# Patient Record
Sex: FEMALE
[De-identification: names, ages, dates, MRNs, and addresses within clinical notes are randomized per-mention and may not be internally consistent; named-entity substitution may affect disease eponyms.]

---

## 2020-03-23 ENCOUNTER — HOSPITAL ENCOUNTER (OUTPATIENT)
Dept: HOSPITAL 88 - DX | Age: 65
End: 2020-03-23
Attending: OBSTETRICS & GYNECOLOGY
Payer: MEDICARE

## 2020-03-23 DIAGNOSIS — Z01.818: Primary | ICD-10-CM

## 2020-03-23 DIAGNOSIS — N81.89: ICD-10-CM

## 2020-03-23 PROCEDURE — 93005 ELECTROCARDIOGRAM TRACING: CPT

## 2020-03-23 PROCEDURE — 71046 X-RAY EXAM CHEST 2 VIEWS: CPT

## 2020-03-23 NOTE — DIAGNOSTIC IMAGING REPORT
Chest, PA and lateral.



History: N81.89.



Comparison: None available.



Discussion:  The cardiomediastinal silhouette and pulmonary vasculature are

within normal limits. The lungs are clear without evidence of consolidation or

effusion.  There are mild degenerative changes of the thoracic spine.



IMPRESSION: 

No acute cardiopulmonary abnormality.



Signed by: Stephen E Dreyer, MD on 3/23/2020 10:11 AM

## 2020-04-21 LAB
ALBUMIN SERPL-MCNC: 4 G/DL (ref 3.5–5)
ALBUMIN/GLOB SERPL: 1.3 {RATIO} (ref 0.8–2)
ALP SERPL-CCNC: 73 IU/L (ref 40–150)
ALT SERPL-CCNC: 24 IU/L (ref 0–55)
ANION GAP SERPL CALC-SCNC: 10 MMOL/L (ref 8–16)
BASOPHILS # BLD AUTO: 0.1 10*3/UL (ref 0–0.1)
BASOPHILS NFR BLD AUTO: 1 % (ref 0–1)
BUN SERPL-MCNC: 16 MG/DL (ref 7–26)
BUN/CREAT SERPL: 23 (ref 6–25)
CALCIUM SERPL-MCNC: 9.7 MG/DL (ref 8.4–10.2)
CHLORIDE SERPL-SCNC: 104 MMOL/L (ref 98–107)
CO2 SERPL-SCNC: 31 MMOL/L (ref 22–29)
DEPRECATED NEUTROPHILS # BLD AUTO: 2.8 10*3/UL (ref 2.1–6.9)
EGFRCR SERPLBLD CKD-EPI 2021: > 60 ML/MIN (ref 60–?)
EOSINOPHIL # BLD AUTO: 0.1 10*3/UL (ref 0–0.4)
EOSINOPHIL NFR BLD AUTO: 1.7 % (ref 0–6)
ERYTHROCYTE [DISTWIDTH] IN CORD BLOOD: 13.1 % (ref 11.7–14.4)
GLOBULIN PLAS-MCNC: 3 G/DL (ref 2.3–3.5)
GLUCOSE SERPLBLD-MCNC: 94 MG/DL (ref 74–118)
HCT VFR BLD AUTO: 41.2 % (ref 34.2–44.1)
HGB BLD-MCNC: 12.9 G/DL (ref 12–16)
LYMPHOCYTES # BLD: 2.4 10*3/UL (ref 1–3.2)
LYMPHOCYTES NFR BLD AUTO: 40.3 % (ref 18–39.1)
MCH RBC QN AUTO: 28.2 PG (ref 28–32)
MCHC RBC AUTO-ENTMCNC: 31.3 G/DL (ref 31–35)
MCV RBC AUTO: 90.2 FL (ref 81–99)
MONOCYTES # BLD AUTO: 0.6 10*3/UL (ref 0.2–0.8)
MONOCYTES NFR BLD AUTO: 9.5 % (ref 4.4–11.3)
NEUTS SEG NFR BLD AUTO: 47.3 % (ref 38.7–80)
PLATELET # BLD AUTO: 355 X10E3/UL (ref 140–360)
POTASSIUM SERPL-SCNC: 5 MMOL/L (ref 3.5–5.1)
RBC # BLD AUTO: 4.57 X10E6/UL (ref 3.6–5.1)
SODIUM SERPL-SCNC: 140 MMOL/L (ref 136–145)

## 2020-04-22 ENCOUNTER — HOSPITAL ENCOUNTER (OUTPATIENT)
Dept: HOSPITAL 88 - OR | Age: 65
Discharge: HOME | End: 2020-04-22
Attending: OBSTETRICS & GYNECOLOGY
Payer: MEDICARE

## 2020-04-22 VITALS — SYSTOLIC BLOOD PRESSURE: 130 MMHG | DIASTOLIC BLOOD PRESSURE: 73 MMHG

## 2020-04-22 DIAGNOSIS — N39.3: ICD-10-CM

## 2020-04-22 DIAGNOSIS — N81.2: Primary | ICD-10-CM

## 2020-04-22 DIAGNOSIS — F41.9: ICD-10-CM

## 2020-04-22 DIAGNOSIS — K21.9: ICD-10-CM

## 2020-04-22 DIAGNOSIS — R06.83: ICD-10-CM

## 2020-04-22 DIAGNOSIS — Z87.891: ICD-10-CM

## 2020-04-22 PROCEDURE — 85025 COMPLETE CBC W/AUTO DIFF WBC: CPT

## 2020-04-22 PROCEDURE — 80053 COMPREHEN METABOLIC PANEL: CPT

## 2020-04-22 PROCEDURE — 36415 COLL VENOUS BLD VENIPUNCTURE: CPT

## 2020-04-22 PROCEDURE — 57282 COLPOPEXY EXTRAPERITONEAL: CPT

## 2020-04-22 PROCEDURE — 57240 ANTERIOR COLPORRHAPHY: CPT

## 2020-04-22 NOTE — PRE OP HISTORY & PHYSICAL
Having surgery tomorrow.

 

HISTORY OF PRESENT ILLNESS:  A 64-year-old  5, para 5, who has been evaluated in

the office on  for uterine prolapse.  She complains of bulge in the vaginal

area, incomplete emptying, and severe pelvic discomfort, that has been getting worse.

The bulge is estimated to be lemon size, but she says now it is getting bigger and she

has to use her fingers to apply bladder pressure to empty her bladder.  She also

complains of urinary incontinence, stress incontinence, frequency, and uses many pads.

Symptoms have been accentuating by standing, lifting, and straining.  Over the last 6

weeks, the patient has been calling the office asking for her surgery as her symptoms

are getting worse and more discomfort has been experienced. 

 

PAST MEDICAL HISTORY:  None of significance.

 

PAST SURGICAL HISTORY:  She had hysterectomy and knee replacement.

 

ALLERGIES:  NO KNOWN DRUG ALLERGIES.

 

SOCIAL HISTORY:  Denies smoking, alcohol, or drug use.

 

PHYSICAL EXAMINATION:

VITAL SIGNS:  Stable. 

CHEST:  Clear to auscultation. 

CARDIOVASCULAR:  Regular rhythm. 

ABDOMEN:  Soft, nontender. 

APPEARANCE:  She is a well-nourished, well-developed woman with no signs of severe

distress. 

VAGINAL:  Shows stage III cystocele with second-degree prolapse.

ASSESSMENT AND PLAN:  Due to the patient's current symptoms and inability to void except

by pushing the bladder upwards with her fingers and her distress is getting worse.  We

will proceed with cystocele repair and sacrospinous colpopexy.  Risks and potential

complications were all explained to the patient's, including bleeding, infection, organ

injury also.  The patient was offered pessary for treatment, but she declined.  All of

her questions were answered. 

 

 

 

 

______________________________

Julia Martinez MD DD/CHRISTY

D:  2020 16:47:11

T:  2020 17:59:43

Job #:  [id number]

## 2020-04-22 NOTE — XMS REPORT
Patient Summary Document

                             Created on: 2020



GABRIELA MILLER

External Reference #: [id number]

: 1955

Sex: Female



Demographics







                          Address                    [address] [place]  [address]

 

                          Home Phone                [phone]

 

                          Preferred Language        Unknown

 

                          Marital Status            Unknown

 

                          Anabaptism Affiliation     Unknown

 

                          Race                      Unknown

 

                                        Additional Race(s) 

 

 

                          Ethnic Group              Unknown





Author







                          Author                    [facility]

 

                          [facility]

 

                          Address                   Unknown

 

                          Phone                     Unavailable







Support







                Name            Relationship    Address         Phone

 

                    JIM MILLER       PRS                 [id number]

                                        ...

[place], [place]  [address] [phone]

 

                    JIM MILLER       PRS                 [id number]

                                        ...

[place]  [address]                 [phone]

 

                    MARLO MILLER    PRS                 [address] [place], [place]  [address] [phone]

 

                    JIM MILLER       PRS                 [address] [place]  [address]                  [phone]







Care Team Providers







                    Care Team Member Name    Role                Phone

 

                    RENNY MEJIA      Unavailable         Unavailable







Payers







             Payer Name    Policy Type    Policy Number    Effective Date    Expiration Date







Problems

This patient has no known problems.



Allergies, Adverse Reactions, Alerts







          Allergy Name    Allergy Type    Status    Severity    Reaction(s)    Onset Date    Inactive 

Date                      Treating Clinician        Comments

 

        No Known Allergies    DA      Active    U               2014 00:00:00                     







Medications

This patient has no known medications.



Encounters







             Start Date/Time    End Date/Time    Encounter Type    Admission Type    Attending Clinicians

                    Care Facility       Care Department     Encounter ID

 

        2018 13:02:06    2018 13:02:06    Outpatient                    [facility]     [id number]

 

        2018 00:00:00    2018 00:00:00    Outpatient                    [facility]     [id number]

 

        2018 00:00:00    2018 00:00:00    Outpatient                    [facility]     [id number]

 

        2018-10-24 00:00:00    2018-10-24 00:00:00    Outpatient                    [facility]     [id number]

 

        2018-10-24 00:00:00    2018-10-24 00:00:00    Outpatient                    [facility]     [id number]

 

        2018-10-22 11:57:28    2018-10-22 11:57:28    Outpatient                    [facility]     [id number]

 

        2018-10-16 14:07:26    2018-10-16 14:07:26    Outpatient                    [facility]     [id number]

 

        2018-10-16 13:01:04    2018-10-16 13:01:04    Outpatient                    [facility]     [id number]

 

        2018 14:22:45    2018 14:22:45    Outpatient                    [facility]     [id number]

 

        2018 00:00:00    2018 00:00:00    Outpatient                    [facility]     [id number]

 

        2018 00:00:00    2018 00:00:00    Outpatient                    [facility]     [id number]

 

        2018 00:00:00    2018 00:00:00    Outpatient                    [facility]     [id number]

 

        2018 10:25:09    2018 10:25:09    Outpatient                    [facility]     [id number]

 

        2018 10:51:00    2018 10:51:00    Outpatient                    [facility]     [id number]

 

        2018 08:48:47    2018 08:48:47    Outpatient                    [facility]     [id number]

 

        2017 00:00:00    2017 00:00:00    Outpatient                    [facility]     [id number]

 

        2017 00:00:00    2017 00:00:00    Outpatient                    [facility]     [id number]

 

        2017 00:00:00    2017 00:00:00    Outpatient                    [facility]     [id number]

 

        2017 13:28:46    2017 13:28:46    Outpatient                    [facility]     [id number]

 

        2017 00:00:00    2017 00:00:00    Outpatient                    [facility]     [id number]

 

        2017 00:00:00    2017 00:00:00    Outpatient                    [facility]     [id number]

 

        2017-11-10 09:49:20    2017-11-10 09:49:20    Outpatient                    [facility]     [id number]

 

        2017 13:44:58    2017 13:44:58    Outpatient                    [facility]     [id number]

 

        2017 13:43:32    2017 13:43:32    Outpatient                    [facility]     [id number]

 

        2017 10:54:44    2017 10:54:44    Outpatient                    [facility]     [id number]

 

        2017 00:00:00    2017 00:00:00    Outpatient                    [facility]     [id number]

 

        2017-10-27 00:00:00    2017-10-27 00:00:00    Outpatient                    [facility]     [id number]

 

        2017-10-24 10:24:51    2017-10-24 10:24:51    Outpatient                    [facility]     [id number]

 

        2017-10-19 16:03:17    2017-10-19 16:03:17    Outpatient                    [facility]     [id number]

 

        2017-10-19 14:07:49    2017-10-19 14:07:49    Outpatient                    [facility]     [id number]

 

        2017-10-19 14:04:13    2017-10-19 14:04:13    Outpatient                    [facility]     [id number]

 

        2017-10-11 08:48:10    2017-10-11 08:48:10    Outpatient                    [facility]     [id number]

 

        2017-10-10 10:26:08    2017-10-10 10:26:08    Outpatient                    [facility]     [id number]

 

        2017 10:16:06    2017 10:16:06    Outpatient                    [facility]     [id number]

 

        2017 14:08:01    2017 14:08:01    Outpatient                    [facility]     [id number]

 

        2017 14:33:04    2017 14:33:04    Outpatient                    [facility]     [id number]

 

        2017 14:13:56    2017 14:13:56    Outpatient                    [facility]     [id number]

 

        2017 14:02:15    2017 14:02:15    Outpatient                    [facility]     [id number]

 

        2017 14:09:12    2017 14:09:12    Outpatient                    [facility]     [id number]

 

        2017 14:08:55    2017 14:08:55    Outpatient                    [facility]     [id number]

 

        2017 00:00:00    2017 00:00:00    Outpatient                    [facility]     [id number]

 

        2017 00:00:00    2017 00:00:00    Outpatient                    [facility]     [id number]

 

        2017 00:00:00    2017 00:00:00    Outpatient                    [facility]     [id number]

 

        2017 00:00:00    2017 00:00:00    Outpatient                    [facility]     [id number]

 

        2017 00:00:00    2017 00:00:00    Outpatient                    [facility]     [id number]

 

        2017 00:00:00    2017 00:00:00    Outpatient                    [facility]     [id number]

 

        2017 00:00:00    2017 00:00:00    Outpatient                    [facility]     [id number]

 

        2017 00:00:00    2017 00:00:00    Outpatient                    [facility]     [id number]

 

        2017 00:00:00    2017 00:00:00    Outpatient                    [facility]     [id number]

 

        2017-08-15 00:00:00    2017-08-15 00:00:00    Outpatient                    [facility]     [id number]

 

        2017 07:47:08    2017 07:47:08    Outpatient                    [facility]     [id number]

 

        2017 13:29:56    2017 13:29:56    Outpatient                    [facility]     [id number]

 

        2017 09:38:30    2017 09:38:30    Outpatient                    [facility]     [id number]

 

        2017 08:43:41    2017 08:43:41    Outpatient                    [facility]     [id number]

 

        2017 08:14:53    2017 08:14:53    Outpatient                    [facility]     [id number]

 

        2017 08:58:36    2017 08:58:36    Outpatient                    [facility]     [id number]

 

        2017 08:49:19    2017 08:49:19    Outpatient                    [facility]     [id number]

 

        2017 13:22:20    2017 13:22:20    Outpatient                    [facility]     [id number]

 

        2017-07-10 14:13:05    2017-07-10 14:13:05    Outpatient                    [facility]     [id number]

 

        2017 00:00:00    2017 00:00:00    Outpatient                    [facility]     [id number]

 

        2017 00:00:00    2017 00:00:00    Outpatient                    [facility]     [id number]

 

        2017 00:00:00    2017 00:00:00    Outpatient                    [facility]     [id number]

 

        2017 13:31:11    2017 13:31:11    Outpatient                    [facility]     [id number]

 

        2017 00:00:00    2017 00:00:00    Outpatient                    [facility]     [id number]

 

        2017-06-10 13:42:13    2017-06-10 13:42:13    Emergency                    [facility]     [id number]

 

        2017-06-10 12:32:03    2017-06-10 12:32:03    Emergency                    [facility]     [id number]

 

        2017-06-10 10:31:59    2017-06-10 10:31:59    Emergency                    [facility]     [id number]

 

        2017 05:48:00    2017 05:48:00    Outpatient                    [facility]     [id number]

 

        2017 10:29:35    2017 00:00:00    Inpatient                    [facility]     [id number]

 

        2017 10:05:22    2017 10:05:22    Outpatient                    [facility]     [id number]

 

        2017 13:51:08    2017 13:51:08    Outpatient                    [facility]     [id number]

 

        2017 09:21:35    2017 09:21:35    Outpatient                    [facility]     [id number]

 

        2017 00:00:00    2017 00:00:00    Outpatient                    [facility]     [id number]

 

        2017 14:16:03    2017 14:16:03    Outpatient                    [facility]     [id number]







Results







           Test Description    Test Time    Test Comments    Text Results    Atomic Results    Result

 Comments

 

                CHEST 2 VIEWS    2020 10:10:00                                                             

                                             [place] [address]  
   Patient Name: GABRIELA MILLER                                   MR #: 
[id number]                     : 1955                                  
Age/Sex: 64/F  Acct #: [id number]                              Req #: [id number]
[id number]  Adm Physician:                                                      
Ordered by: RENNY MEJIA MD                            Report #: [id number]     
  Location: OR                                      Room/Bed:                   
 
___________________________________________________________________________________________________
   Procedure: [id number] DX/CHEST 2 VIEWS  Exam Date:                            
Exam Time:                                               REPORT STATUS: Signed  
 Chest, PA and lateral.      History: N81.89.      Comparison: None available.  
   Discussion:  The cardiomediastinal silhouette and pulmonary vasculature are  
within normal limits. The lungs are clear without evidence of consolidation or  
effusion.  There are mild degenerative changes of the thoracic spine.      
IMPRESSION:    No acute cardiopulmonary abnormality.      Signed by: Stephen E Dreyer, MD on 3/23/2020 10:11 AM        Dictated By: STEPHEN E DREYER MD  
Electronically Signed By: STEPHEN E DREYER MD on 20 1011  Transcribed By: 
ORI on 20 1011       COPY TO:   RENNY MEJIA MD              

 

                COLON BIOPSY    2020 17:09:00                    --------------------------------------------------------------------------------------------RUN

 DATE: 20                         [facility]                          
PAGE 1   RUN TIME: 1709                            Specimen Inquiry             
      RUN USER: INTERFACE                                                       
   ----------------------------------------------------------------
----------------------------PATIENT: GABRIELA MILLER          ACCT #: 
[id number] LOC:  [facility]      U #: [id number]                                   
   AGE/SX: 64/F         ROOM:            RE20CHERYL DR:  Rajat Campbell MD 
            :    [birth year]     BED:             DIS:                          
                     STATUS: DEP [facility]      TLOC:           
----------------------------
---------------------------------------------------------------- SPEC #: 
[id number]     RECD:      STATUS:  ARI           RESALOMON #: 
[id number]                           BRANDI:  DR: Rajat Campbell MD               ENTERED:      SP TYPE: COLONBX        OTHR DR: 
Amador Thorpe MD    ORDERED:  GROSS                                   
                                          COPIES TO:   Rajat Campbell MD   [address] [address]   [address] [place] [id number] [phone]    Amador Thorpe MD   [address] [place] [address]   [phone] 
PROCEDURES: GROSS () TISSUES:           1. ILEOCECAL VALVE - COLD 
BX         2. RECTUM, NOS - POLYP HOT BX         CLINICAL HISTORY    COLLECTION 
DATE: 20       COLON CANCER SCREENING         FINAL DIAGNOSIS    Ileocecal 
valve, biopsy:        COLONIC MUCOSA WITH PROMINENT SUBMUCOSAL ADIPOSE TISSUE 
SUGGESTIVE          OF SUBMUCOSAL LIPOMA        MILD CHRONIC INFLAMMATION AND 
REACTIVE EPITHELIAL CHANGE PRESENT        NO HYPERPLASTIC OR ADENOMATOUS CHANGE 
PRESENT        NEGATIVE FOR MALIGNANCY       Rectum polyp, hot biopsy:        
TUBULAR ADENOMA        NEGATIVE FOR HIGH GRADE DYSPLASIA AND MALIGNANCY         
     RRB/beulah ABRAHAM   88305x2                                ** CONTINUED ON NEXT 
PAGE ** -----------------------------------------------------------------------
---------------------RUN DATE: 20                         [facility]  
                       PAGE 2   RUN TIME: 1709                            
Specimen Inquiry                    RUN USER: INTERFACE                         
                                 
--------------------------------------------------------------------------------------------SPEC
#: [id number]    PATIENT: GABRIELA MILLER           #[id number]  
(Continued)--------------------------------------------------------------------------------------------
           MACROSCOPIC    The first specimen is received in formalin, labeled 
with the patient's name,   and identified as "ileocecal valve", and consists of 
dark pink biopsy tissue   measuring 0.35 cm in aggregate, submitted as (1).     
 The second specimen is received in formalin, labeled with the patient's name,  
and identified as "rectum polyp", and consists of tan biopsy tissue measuring   
0.25 cm, submitted as (2).       GROSS PERFORMED AT [facility]   [address] [place], [place] [address] (p)[phone]           MICROSCOPIC    All of the stains, including 
any controls performed, stain appropriately.       MICROSCOPIC PERFORMED AT [facility]   [address] [place] [address] (p)[phone]         PERFORMING SITE    Diagnosis perfo
rmed at:        [facility]        [address] [place] [address] [phone]----------------------------------------
---------------------------------------------------- Signed SIGNATURE ON FILE   
                    Ryder Gamboa MD 20 1709  
------------------------------------------------------------------
--------------------------                                              ** END 
OF REPORT **

## 2020-04-23 NOTE — OPERATIVE REPORT
DATE OF PROCEDURE:  

 

SURGEON:  Julia Martinez MD

 

PREOPERATIVE DIAGNOSIS:  Pelvic organ prolapse.

 

POSTOPERATIVE DIAGNOSIS:  Pelvic organ prolapse.

 

PROCEDURE:  Pelvic floor repair.

 

COMPLICATIONS:  None.

 

ESTIMATED BLOOD LOSS:  20 mL.

 

DESCRIPTION OF PROCEDURE:  The patient was taken to the OR, where general anesthesia was

found to be adequate.  She was prepped and draped in normal sterile fashion, placed in

dorsal lithotomy position.  Examination under anesthesia revealed cystocele grade 3,

second grade vaginal vault prolapse.  A weighted speculum was placed inside vagina and

the anterior wall of the vagina was injected with Marcaine with epinephrine 0.25%

subcutaneously and vaginal vault was held with two Allis clamps.  Using the knife,

vaginal vault incision was made and the vagina was dissected off the bladder using

Metzenbaum scissors using the push-spread technique and opened the midline anteriorly

using the same instrument.  The two flaps of vagina dissected off the bladder using both

sharp and blunt dissection.  Inferior pubic ramus was reached on each side.  The pelvic

fascia was pierced.  Sacrospinous ligament was palpated.  Vicryl suture was placed in

the sacrospinous ligament for the sacrocolpopexy and the other end was stitched to the

vaginal vault.  Following this, supracervical ligaments on both sides of the pelvis were

approximated using Vicryl zero stitch.  Excess vaginal skin was trimmed off using the

curved Bell scissors and the vagina was closed with interlocking sutures of Vicryl 2-0

in the sacral spine.  Sutures were tied and the vaginal vault was lifted.  The patient

tolerated the procedure well.  Lap and instrument count were correct x2 at the end of

the procedure. 

 

 

 

 

______________________________

Julia Martinez MD DD/CHRISTY

D:  04/23/2020 09:25:52

T:  04/23/2020 15:40:35

Job #:  [id number]

## 2020-06-15 ENCOUNTER — HOSPITAL ENCOUNTER (OUTPATIENT)
Dept: HOSPITAL 88 - OR | Age: 65
Setting detail: OBSERVATION
LOS: 1 days | Discharge: HOME | End: 2020-06-16
Attending: SPECIALIST | Admitting: SPECIALIST
Payer: MEDICARE

## 2020-06-15 VITALS — DIASTOLIC BLOOD PRESSURE: 54 MMHG | SYSTOLIC BLOOD PRESSURE: 99 MMHG

## 2020-06-15 VITALS — SYSTOLIC BLOOD PRESSURE: 116 MMHG | DIASTOLIC BLOOD PRESSURE: 69 MMHG

## 2020-06-15 VITALS — BODY MASS INDEX: 35.79 KG/M2 | HEIGHT: 63 IN | WEIGHT: 202 LBS

## 2020-06-15 VITALS — DIASTOLIC BLOOD PRESSURE: 69 MMHG | SYSTOLIC BLOOD PRESSURE: 116 MMHG

## 2020-06-15 DIAGNOSIS — M17.0: Primary | ICD-10-CM

## 2020-06-15 DIAGNOSIS — K21.9: ICD-10-CM

## 2020-06-15 DIAGNOSIS — Z11.59: ICD-10-CM

## 2020-06-15 DIAGNOSIS — D64.9: ICD-10-CM

## 2020-06-15 DIAGNOSIS — I95.9: ICD-10-CM

## 2020-06-15 PROCEDURE — 27447 TOTAL KNEE ARTHROPLASTY: CPT

## 2020-06-15 PROCEDURE — 86900 BLOOD TYPING SEROLOGIC ABO: CPT

## 2020-06-15 PROCEDURE — 86850 RBC ANTIBODY SCREEN: CPT

## 2020-06-15 PROCEDURE — 87635 SARS-COV-2 COVID-19 AMP PRB: CPT

## 2020-06-15 PROCEDURE — 97162 PT EVAL MOD COMPLEX 30 MIN: CPT

## 2020-06-15 PROCEDURE — 97116 GAIT TRAINING THERAPY: CPT

## 2020-06-15 PROCEDURE — 85025 COMPLETE CBC W/AUTO DIFF WBC: CPT

## 2020-06-15 PROCEDURE — 73560 X-RAY EXAM OF KNEE 1 OR 2: CPT

## 2020-06-15 PROCEDURE — 36415 COLL VENOUS BLD VENIPUNCTURE: CPT

## 2020-06-15 PROCEDURE — 97530 THERAPEUTIC ACTIVITIES: CPT

## 2020-06-15 PROCEDURE — 86920 COMPATIBILITY TEST SPIN: CPT

## 2020-06-15 PROCEDURE — 97139 UNLISTED THERAPEUTIC PX: CPT

## 2020-06-15 RX ADMIN — CEFAZOLIN SODIUM SCH MLS/HR: 1 SOLUTION INTRAVENOUS at 19:52

## 2020-06-15 RX ADMIN — SODIUM CHLORIDE PRN MG: 900 INJECTION INTRAVENOUS at 19:52

## 2020-06-15 RX ADMIN — SODIUM CHLORIDE SCH MLS/HR: 9 INJECTION, SOLUTION INTRAVENOUS at 18:40

## 2020-06-15 RX ADMIN — Medication SCH MG: at 18:40

## 2020-06-15 RX ADMIN — CELECOXIB SCH MG: 100 CAPSULE ORAL at 18:40

## 2020-06-15 NOTE — NUR
PT ARRIVED TO ROOM 106 FROM PACU; PT AWAKE, ALERT, NO SIGNS OF DISTRESS. PT'S SON IRAJ AT 
BEDSIDE, WHO WAS GIVEN CLEARANCE FROM [facility] TO STAY AFTER HOURS. WILL PASS ON REPORT FOR NEW 
ADMISSION TO NIGHT SHIFT.

## 2020-06-15 NOTE — OPERATIVE REPORT
DATE OF PROCEDURE:  06/15/2020

 

SURGEON:  Pedro Reid MD

 

ASSISTANT:  Rashad Kapoor, certified PA.

 

PREOPERATIVE DIAGNOSIS:  Osteoarthritis, right knee.

 

POSTOPERATIVE DIAGNOSIS:  Osteoarthritis, right knee.

 

PROCEDURE:  Right total knee arthroplasty.

 

INDICATIONS:  The patient is a 64-year-old lady, who has end-stage arthritis in her

right knee.  She has failed conservative management and would like to proceed with a

right knee replacement.  The risks and benefits of the surgery have been discussed.  The

recovery has been explained.  All of her questions have been answered.  She states she

understands and wishes to proceed.  She had her left knee replaced at an outside

institution some years ago. 

 

PROCEDURE IN DETAIL:  The patient was brought to the operating room and placed under

general anesthetic.  She received prophylactic antibiotics, a regional block and

tranexamic acid in the holding area.  Her right lower extremity was prepped and draped

in a sterile manner.  A preoperative time-out was performed.  The extremity was

exsanguinated and a proximal tourniquet was inflated to 300 mmHg.  An anterior approach

with a medial parapatellar arthrotomy was performed.  Clear synovial fluid was removed

from the joint.  Soft tissue releases were performed to bring the knee up into flexion

with the patella everted.  Meniscal remnants, marginal osteophytes in the cruciate

ligaments were removed.  A Smith and NephBIXI knee system was used with

ultracongruent tibial insert.  After adequate exposure of the proximal tibia, an

extramedullary cutting guide was used to make the bone cut.  The tibial base plate was a

size 4.  The central fin punch was impacted and attention was directed towards the

distal femur.  An intramedullary cutting guide was used to resect the distal femur in 6

degrees of valgus and rotation referencing off a combination of landmarks including

Whitesides line, the epicondylar axis, and the posterior condyles.  The femoral

component was a size.  The anterior and posterior cuts were made.  Trial reductions were

performed.  A 9 mm ultracongruent tibial insert provided appropriate soft tissue

balancing in full extension and 90 degrees of flexion.  The patella was resurfaced with

a 32 mm x 7.5 mm patellar button.  The thickness was checked before and after was right

around 22 mm.  Patellar tracking was noted to be concentric.  The trial implants were

then all removed.  The knee was further irrigated with a shower tip pulsatile lavage.  A

100 mL premixed pericapsular MADHAV injection was placed into the surrounding soft tissue.

 The components were cemented into place using a single mix of high viscosity Biomet

cement preloaded with antibiotics.  Care was taken to remove extravasated cement.  The

wound was further irrigated with a shower tip pulsatile lavage while the cement cured.

The arthrotomy was then closed after strictly 500 mg of vancomycin powder into the

wound.  The knee was put through flexion and extension to ensure a secure closure.  The

skin was closed with subcuticular Vicryl and staples.  A sterile Aquacel bandage was

applied.  The patient was extubated and 

transported to the recovery room in stable condition.  Blood loss was minimal.  All

needle and sponge counts were correct. 

 

 

 

______________________________

Pedro Reid MD DR/CHRISTY

D:  06/15/2020 13:44:28

T:  06/15/2020 19:56:23

Job #:  [id number]

## 2020-06-15 NOTE — NUR
Patient is nauseated.medicated with zofran 4mg iv.assessment done.no resp.distress.no pain 
voiced.on foot pump.dressing is dry.bed locked and in lowest position.phone and call light 
within reach.instructed to call for assistance as needed.

## 2020-06-15 NOTE — DIAGNOSTIC IMAGING REPORT
EXAM:  KNEE RIGHT 1-2 VIEWS



DATE: 6/15/2020 1:10 PM  



INDICATION: Postop  



COMPARISON: None



FINDINGS:

There are postsurgical changes from right knee arthroplasty. Hardware appears

intact and in anatomic alignment. There is no evidence for acute fracture or

dislocation. No focal lytic or blastic abnormality is identified. There is a

small joint effusion and small amount of soft tissue gas noted about the right

knee, likely related to the recent postsurgical state. Overlying skin staples

noted.





IMPRESSION:



Expected postsurgical changes from recent right knee arthroplasty.



Signed by: Dr. Issa Young MD on 6/15/2020 1:28 PM

## 2020-06-16 VITALS — DIASTOLIC BLOOD PRESSURE: 54 MMHG | SYSTOLIC BLOOD PRESSURE: 96 MMHG

## 2020-06-16 VITALS — DIASTOLIC BLOOD PRESSURE: 63 MMHG | SYSTOLIC BLOOD PRESSURE: 117 MMHG

## 2020-06-16 VITALS — DIASTOLIC BLOOD PRESSURE: 54 MMHG | SYSTOLIC BLOOD PRESSURE: 99 MMHG

## 2020-06-16 VITALS — DIASTOLIC BLOOD PRESSURE: 50 MMHG | SYSTOLIC BLOOD PRESSURE: 103 MMHG

## 2020-06-16 LAB
BASOPHILS # BLD AUTO: 0 10*3/UL (ref 0–0.1)
BASOPHILS NFR BLD AUTO: 0.1 % (ref 0–1)
DEPRECATED NEUTROPHILS # BLD AUTO: 11.5 10*3/UL (ref 2.1–6.9)
EOSINOPHIL # BLD AUTO: 0 10*3/UL (ref 0–0.4)
EOSINOPHIL NFR BLD AUTO: 0 % (ref 0–6)
ERYTHROCYTE [DISTWIDTH] IN CORD BLOOD: 13.3 % (ref 11.7–14.4)
HCT VFR BLD AUTO: 36.2 % (ref 34.2–44.1)
HGB BLD-MCNC: 11.5 G/DL (ref 12–16)
LYMPHOCYTES # BLD: 1.5 10*3/UL (ref 1–3.2)
LYMPHOCYTES NFR BLD AUTO: 10.4 % (ref 18–39.1)
MCH RBC QN AUTO: 28.2 PG (ref 28–32)
MCHC RBC AUTO-ENTMCNC: 31.8 G/DL (ref 31–35)
MCV RBC AUTO: 88.7 FL (ref 81–99)
MONOCYTES # BLD AUTO: 1.3 10*3/UL (ref 0.2–0.8)
MONOCYTES NFR BLD AUTO: 8.8 % (ref 4.4–11.3)
NEUTS SEG NFR BLD AUTO: 80.1 % (ref 38.7–80)
PLATELET # BLD AUTO: 396 X10E3/UL (ref 140–360)
RBC # BLD AUTO: 4.08 X10E6/UL (ref 3.6–5.1)

## 2020-06-16 RX ADMIN — Medication SCH MG: at 08:28

## 2020-06-16 RX ADMIN — SODIUM CHLORIDE SCH MLS/HR: 9 INJECTION, SOLUTION INTRAVENOUS at 04:30

## 2020-06-16 RX ADMIN — CELECOXIB SCH MG: 100 CAPSULE ORAL at 08:28

## 2020-06-16 RX ADMIN — SODIUM CHLORIDE PRN MG: 900 INJECTION INTRAVENOUS at 05:23

## 2020-06-16 RX ADMIN — CEFAZOLIN SODIUM SCH MLS/HR: 1 SOLUTION INTRAVENOUS at 10:50

## 2020-06-16 RX ADMIN — CEFAZOLIN SODIUM SCH MLS/HR: 1 SOLUTION INTRAVENOUS at 03:36

## 2020-06-16 NOTE — CONSULTATION
DATE OF CONSULTATION:    

 

REASON FOR CONSULTATION:  Postop medical management.

 

HISTORY OF PRESENT ILLNESS:  The patient is a 64-year-old lady, status post right total

knee arthroplasty for end-stage osteoarthritis.  She is doing well postoperatively. 

 

REVIEW OF SYSTEMS:

Complains of minimal pain in the right knee and denies any fever, chills, nausea,

vomiting, headache, shortness of breath, or dizziness. 

 

PAST MEDICAL HISTORY:  Significant for reflux disease and osteoarthritis.

 

MEDICATIONS:  See MAR.

 

ALLERGIES:  NONE.

 

SOCIAL HISTORY:  She is , housewife.  Nonsmoker, nondrinker.

 

FAMILY HISTORY:  Noncontributory.

 

PHYSICAL EXAMINATION:

VITAL SIGNS:  Temperature 98.1, pulse 79, blood pressure 96/54, sats 96% on room air. 

GENERAL:  She is in no apparent distress. 

LUNGS:  Clear to auscultation bilaterally. 

NECK:  Supple.  No lymphadenopathy. 

CARDIOVASCULAR:  Regular rate and rhythm. 

ABDOMEN:  Good bowel sounds.  Soft, nontender. 

EXTREMITIES:  No clubbing or cyanosis.  No seepage of a bandage on the right knee. 

Neurologic:  Nonfocal.  Moves all extremities x4.

ASSESSMENT AND PLAN:  

1. Hypotension.  She is asymptomatic at this time, so we will continue to monitor.

2. Reflux disease.  We will continue with her proton pump inhibitor.

3. Anemia.  We will check her CBC.  Please see hospital chart for full details.

 

 

 

 

______________________________

MD VALENTINA Solorzano/CHRISTY

D:  06/16/2020 05:06:18

T:  06/16/2020 06:34:14

Job #:  [id number]

## 2020-06-16 NOTE — NUR
DR TAYLOR OFFICE PREARRANGED FOLLOWING DISCHARGE PLAN OF: HOME [address], [facility] WITH [facility] [phone] CONFIRMED WITH [facility]

DME 3 IN ONE COMMODE. AND CPM TO BE DELIVERED TO HOME PROVIDED BY [facility] [employer] [facility] 
NANCY ERICKSON SIGNED AND ON CHART COPY LEFT WITH PATIENT GAVE CARD FOR QUESTIONS AND OR CONCERNS.

## 2023-05-19 LAB
BASOPHILS # BLD AUTO: 0.1 10*3/UL (ref 0–0.1)
BASOPHILS NFR BLD AUTO: 1.3 % (ref 0–1)
DEPRECATED NEUTROPHILS # BLD AUTO: 2.7 10*3/UL (ref 2.1–6.9)
EOSINOPHIL # BLD AUTO: 0.2 10*3/UL (ref 0–0.4)
EOSINOPHIL NFR BLD AUTO: 3.6 % (ref 0–6)
ERYTHROCYTE [DISTWIDTH] IN CORD BLOOD: 14.1 % (ref 11.7–14.4)
HCT VFR BLD AUTO: 39.2 % (ref 34.2–44.1)
HGB BLD-MCNC: 12.3 G/DL (ref 12–16)
LYMPHOCYTES # BLD: 2.6 10*3/UL (ref 1–3.2)
LYMPHOCYTES NFR BLD AUTO: 41.9 % (ref 18–39.1)
MCH RBC QN AUTO: 28.3 PG (ref 28–32)
MCHC RBC AUTO-ENTMCNC: 31.4 G/DL (ref 31–35)
MCV RBC AUTO: 90.3 FL (ref 81–99)
MONOCYTES # BLD AUTO: 0.6 10*3/UL (ref 0.2–0.8)
MONOCYTES NFR BLD AUTO: 10 % (ref 4.4–11.3)
NEUTS SEG NFR BLD AUTO: 42.9 % (ref 38.7–80)
PLATELET # BLD AUTO: 338 X10E3/UL (ref 140–360)
RBC # BLD AUTO: 4.34 X10E6/UL (ref 3.6–5.1)

## 2023-05-23 ENCOUNTER — HOSPITAL ENCOUNTER (OUTPATIENT)
Dept: HOSPITAL 88 - OR | Age: 68
Discharge: HOME | End: 2023-05-23
Attending: INTERNAL MEDICINE
Payer: MEDICARE

## 2023-05-23 DIAGNOSIS — K59.00: ICD-10-CM

## 2023-05-23 DIAGNOSIS — Z01.812: ICD-10-CM

## 2023-05-23 DIAGNOSIS — Z12.11: Primary | ICD-10-CM

## 2023-05-23 DIAGNOSIS — K21.9: ICD-10-CM

## 2023-05-23 DIAGNOSIS — Z01.810: ICD-10-CM

## 2023-05-23 DIAGNOSIS — K63.89: ICD-10-CM

## 2023-05-23 DIAGNOSIS — K22.89: ICD-10-CM

## 2023-05-23 DIAGNOSIS — K57.30: ICD-10-CM

## 2023-05-23 DIAGNOSIS — Z79.1: ICD-10-CM

## 2023-05-23 DIAGNOSIS — M06.9: ICD-10-CM

## 2023-05-23 DIAGNOSIS — F32.A: ICD-10-CM

## 2023-05-23 DIAGNOSIS — Z79.899: ICD-10-CM

## 2023-05-23 DIAGNOSIS — I10: ICD-10-CM

## 2023-05-23 DIAGNOSIS — J38.7: ICD-10-CM

## 2023-05-23 DIAGNOSIS — F41.9: ICD-10-CM

## 2023-05-23 DIAGNOSIS — K29.50: ICD-10-CM

## 2023-05-23 DIAGNOSIS — E78.5: ICD-10-CM

## 2023-05-23 DIAGNOSIS — K44.9: ICD-10-CM

## 2023-05-23 DIAGNOSIS — D12.8: ICD-10-CM

## 2023-05-23 DIAGNOSIS — M81.0: ICD-10-CM

## 2023-05-23 DIAGNOSIS — K64.8: ICD-10-CM

## 2023-05-23 PROCEDURE — 93005 ELECTROCARDIOGRAM TRACING: CPT

## 2023-05-23 PROCEDURE — 43239 EGD BIOPSY SINGLE/MULTIPLE: CPT

## 2023-05-23 PROCEDURE — 36415 COLL VENOUS BLD VENIPUNCTURE: CPT

## 2023-05-23 PROCEDURE — 45378 DIAGNOSTIC COLONOSCOPY: CPT

## 2023-05-23 PROCEDURE — 85025 COMPLETE CBC W/AUTO DIFF WBC: CPT

## 2023-05-23 PROCEDURE — 88305 TISSUE EXAM BY PATHOLOGIST: CPT

## 2023-05-23 PROCEDURE — 88304 TISSUE EXAM BY PATHOLOGIST: CPT

## 2023-05-23 PROCEDURE — 88312 SPECIAL STAINS GROUP 1: CPT

## 2023-05-23 PROCEDURE — 45380 COLONOSCOPY AND BIOPSY: CPT

## 2023-05-23 PROCEDURE — 45385 COLONOSCOPY W/LESION REMOVAL: CPT

## 2023-05-23 PROCEDURE — 88342 IMHCHEM/IMCYTCHM 1ST ANTB: CPT
